# Patient Record
Sex: MALE | Race: OTHER | HISPANIC OR LATINO | URBAN - METROPOLITAN AREA
[De-identification: names, ages, dates, MRNs, and addresses within clinical notes are randomized per-mention and may not be internally consistent; named-entity substitution may affect disease eponyms.]

---

## 2019-11-07 ENCOUNTER — EMERGENCY (EMERGENCY)
Facility: HOSPITAL | Age: 44
LOS: 1 days | Discharge: HOME | End: 2019-11-07
Attending: EMERGENCY MEDICINE | Admitting: EMERGENCY MEDICINE
Payer: COMMERCIAL

## 2019-11-07 VITALS
HEART RATE: 66 BPM | SYSTOLIC BLOOD PRESSURE: 137 MMHG | WEIGHT: 179.9 LBS | OXYGEN SATURATION: 99 % | TEMPERATURE: 99 F | HEIGHT: 62 IN | DIASTOLIC BLOOD PRESSURE: 89 MMHG | RESPIRATION RATE: 16 BRPM

## 2019-11-07 VITALS
OXYGEN SATURATION: 98 % | HEART RATE: 76 BPM | TEMPERATURE: 98 F | RESPIRATION RATE: 18 BRPM | SYSTOLIC BLOOD PRESSURE: 140 MMHG | DIASTOLIC BLOOD PRESSURE: 92 MMHG

## 2019-11-07 PROCEDURE — 99283 EMERGENCY DEPT VISIT LOW MDM: CPT

## 2019-11-07 PROCEDURE — 99283 EMERGENCY DEPT VISIT LOW MDM: CPT | Mod: 25

## 2019-11-07 PROCEDURE — 73630 X-RAY EXAM OF FOOT: CPT

## 2019-11-07 PROCEDURE — 73630 X-RAY EXAM OF FOOT: CPT | Mod: 26,LT

## 2019-11-07 RX ORDER — OXYCODONE HYDROCHLORIDE 5 MG/1
5 TABLET ORAL ONCE
Refills: 0 | Status: DISCONTINUED | OUTPATIENT
Start: 2019-11-07 | End: 2019-11-07

## 2019-11-07 RX ORDER — OXYCODONE HYDROCHLORIDE 5 MG/1
1 TABLET ORAL
Qty: 8 | Refills: 0
Start: 2019-11-07 | End: 2019-11-08

## 2019-11-07 RX ORDER — IBUPROFEN 200 MG
600 TABLET ORAL ONCE
Refills: 0 | Status: COMPLETED | OUTPATIENT
Start: 2019-11-07 | End: 2019-11-07

## 2019-11-07 RX ADMIN — Medication 600 MILLIGRAM(S): at 17:45

## 2019-11-07 RX ADMIN — OXYCODONE HYDROCHLORIDE 5 MILLIGRAM(S): 5 TABLET ORAL at 18:22

## 2019-11-07 RX ADMIN — Medication 600 MILLIGRAM(S): at 17:14

## 2019-11-07 RX ADMIN — OXYCODONE HYDROCHLORIDE 5 MILLIGRAM(S): 5 TABLET ORAL at 18:08

## 2019-11-07 NOTE — ED PROVIDER NOTE - OBJECTIVE STATEMENT
otherwise healthy 43 year old male presents with pain and swelling to left foot after injury. fork lift fell onto left foot at work PTA. has not tried to walk on foot. pain moderate in nature. no n/t. denies other injuries.   no PCP

## 2019-11-07 NOTE — ED PROVIDER NOTE - PROGRESS NOTE DETAILS
Reevaluated patient at bedside.  Patient feeling improved.  Discussed the results of all diagnostic testing in ED. RICES discussed. An opportunity to ask questions was given.  Discussed the importance of prompt, close medical follow-up.  Patient will return with any changes, concerns or persistent / worsening symptoms.  Understanding of all instructions verbalized.

## 2019-11-07 NOTE — ED PROVIDER NOTE - CLINICAL SUMMARY MEDICAL DECISION MAKING FREE TEXT BOX
Farley:  L foot contusion with unremarkable xrays pain and swelling to left foot after injury. will x-ray to eval for fx. motrin for pain    Farley:  L foot contusion with unremarkable xrays

## 2019-11-07 NOTE — ED ADULT NURSE REASSESSMENT NOTE - NS ED NURSE REASSESS COMMENT FT1
Patient was discharged in stable condition, instructions were provided and reviewed with MD Farley, verbalized understanding. All belongings were taken by patient, left AAOX4 was ambulatory and was accompanied by spouse.

## 2019-11-07 NOTE — ED PROVIDER NOTE - MUSCULOSKELETAL, MLM
tenderness and swelling to mid dorsal aspect of left foot. no tenderness to base of 5th metatarsal or lateral/medial malleolus

## 2019-11-07 NOTE — ED PROVIDER NOTE - PATIENT PORTAL LINK FT
You can access the FollowMyHealth Patient Portal offered by [facility] by registering at the following website: [email or web address]. By joining [employer]’s FollowMyHealth portal, you will also be able to view your health information using other applications (apps) compatible with our system.

## 2019-11-07 NOTE — ED ADULT NURSE NOTE - OBJECTIVE STATEMENT
Patient presents to ED, AAOX4, complaining of left foot injury at work, as per patient: "a machine hit my foot and now it hurts to even more my toes". Noted to have swelling to left foot, pulses and sensation intact.

## 2020-03-11 ENCOUNTER — EMERGENCY (EMERGENCY)
Facility: HOSPITAL | Age: 45
LOS: 1 days | Discharge: HOME | End: 2020-03-11
Attending: EMERGENCY MEDICINE | Admitting: EMERGENCY MEDICINE
Payer: COMMERCIAL

## 2020-03-11 VITALS — HEART RATE: 68 BPM | SYSTOLIC BLOOD PRESSURE: 138 MMHG | DIASTOLIC BLOOD PRESSURE: 78 MMHG | RESPIRATION RATE: 16 BRPM

## 2020-03-11 VITALS
WEIGHT: 179.9 LBS | HEART RATE: 78 BPM | HEIGHT: 62 IN | OXYGEN SATURATION: 99 % | SYSTOLIC BLOOD PRESSURE: 140 MMHG | RESPIRATION RATE: 18 BRPM | DIASTOLIC BLOOD PRESSURE: 90 MMHG | TEMPERATURE: 98 F

## 2020-03-11 PROCEDURE — 73562 X-RAY EXAM OF KNEE 3: CPT

## 2020-03-11 PROCEDURE — 99283 EMERGENCY DEPT VISIT LOW MDM: CPT

## 2020-03-11 PROCEDURE — 99283 EMERGENCY DEPT VISIT LOW MDM: CPT | Mod: 25

## 2020-03-11 PROCEDURE — 73562 X-RAY EXAM OF KNEE 3: CPT | Mod: 26,RT

## 2020-03-11 NOTE — ED PROVIDER NOTE - PATIENT PORTAL LINK FT
You can access the FollowMyHealth Patient Portal offered by [facility] by registering at the following website: [email or web address]. By joining [employer]’s FollowMyHealth portal, you will also be able to view your health information using other applications (apps) compatible with our system.

## 2020-03-11 NOTE — ED PROVIDER NOTE - OBJECTIVE STATEMENT
45 y/o male presents to the ED c/o right knee pain. 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 Pt took advil PTA. Came to ED for continued pain and difficulty walking on right knee.

## 2020-03-11 NOTE — ED PROVIDER NOTE - MUSCULOSKELETAL, MLM
RLE chronic appearing deformity of lower leg and foot, FROM, no acute appearing deformity, no edema, nl tone, no point tenderness

## 2020-03-11 NOTE — ED PROVIDER NOTE - CARE PROVIDER_API CALL
Julio Jiang (DO)  Orthopaedic Surgery  [address] [place] [address]  Phone: [phone]  Fax: [phone]  Follow Up Time: 1-3 Days

## 2020-03-11 NOTE — ED ADULT NURSE NOTE - PRIMARY CARE PROVIDER
Spoke with pharmacy. I have been informed. Ms. Thayer. Does not need a PA. Due to her insurance is covering the medication just at a high cost of $1000. Attempted to contact. [facility]. Care pina. Twice. Line is busy. in regards to them covering.Trulicity or Victoza.   
n/a

## 2020-03-11 NOTE — ED PROVIDER NOTE - CLINICAL SUMMARY MEDICAL DECISION MAKING FREE TEXT BOX
44 y.o. M c/o right knee injury s/p fall at work, h/o birth defect of right lower leg, no obvious abnormality on exam, will xr to r/o fx, will need knee brace/crutches and outpt ortho if not improving

## 2021-12-21 ENCOUNTER — EMERGENCY (EMERGENCY)
Facility: HOSPITAL | Age: 46
LOS: 1 days | Discharge: HOME | End: 2021-12-21
Attending: INTERNAL MEDICINE | Admitting: INTERNAL MEDICINE
Payer: COMMERCIAL

## 2021-12-21 VITALS
HEART RATE: 86 BPM | OXYGEN SATURATION: 96 % | TEMPERATURE: 98 F | DIASTOLIC BLOOD PRESSURE: 85 MMHG | RESPIRATION RATE: 16 BRPM | HEIGHT: 62 IN | WEIGHT: 191.8 LBS | SYSTOLIC BLOOD PRESSURE: 130 MMHG

## 2021-12-21 VITALS
RESPIRATION RATE: 20 BRPM | HEART RATE: 82 BPM | DIASTOLIC BLOOD PRESSURE: 87 MMHG | OXYGEN SATURATION: 100 % | SYSTOLIC BLOOD PRESSURE: 128 MMHG | TEMPERATURE: 99 F

## 2021-12-21 PROCEDURE — 72100 X-RAY EXAM L-S SPINE 2/3 VWS: CPT | Mod: 26

## 2021-12-21 PROCEDURE — 99284 EMERGENCY DEPT VISIT MOD MDM: CPT

## 2021-12-21 PROCEDURE — 72100 X-RAY EXAM L-S SPINE 2/3 VWS: CPT

## 2021-12-21 PROCEDURE — 99283 EMERGENCY DEPT VISIT LOW MDM: CPT | Mod: 25

## 2021-12-21 RX ORDER — OXYCODONE AND ACETAMINOPHEN 5; 325 MG/1; MG/1
1 TABLET ORAL ONCE
Refills: 0 | Status: DISCONTINUED | OUTPATIENT
Start: 2021-12-21 | End: 2021-12-21

## 2021-12-21 RX ORDER — OXYCODONE AND ACETAMINOPHEN 5; 325 MG/1; MG/1
1 TABLET ORAL
Qty: 12 | Refills: 0
Start: 2021-12-21 | End: 2021-12-23

## 2021-12-21 RX ADMIN — Medication 50 MILLIGRAM(S): at 01:27

## 2021-12-21 RX ADMIN — OXYCODONE AND ACETAMINOPHEN 1 TABLET(S): 5; 325 TABLET ORAL at 01:27

## 2021-12-21 NOTE — ED PROVIDER NOTE - PATIENT PORTAL LINK FT
You can access the FollowMyHealth Patient Portal offered by [facility] by registering at the following website: [email or web address]. By joining [employer]’s FollowMyHealth portal, you will also be able to view your health information using other applications (apps) compatible with our system.

## 2021-12-21 NOTE — ED PROVIDER NOTE - NSFOLLOWUPINSTRUCTIONS_ED_ALL_ED_FT
1) Follow-up with your Primary Medical Doctor or referred doctor. Call today / next business day for prompt follow-up.  2) With back pain, whether it is a new pain or a chronic pain, it is very important to have close, prompt outpatient follow-up for continued workup, evaluation and definitive diagnosis.  If you develop worsening or persistent pain, any numbness, tingling, weakness of one or both of your legs, any fever, or any changes / difficulty urinating then you will need to see your back doctor immediately or you can return to the emergency room.  Many times your doctor will need to set you up for further imaging / testing such as an MRI.  3) See attached instruction sheets for additional information, including information regarding signs and symptoms to look out for, reasons to seek immediate care and other important instructions.  4) Follow-up with your Orthopedist or Neurosurgeon as soon as possible. If you do not have one then you will need to call the referred doctor as soon as possible (today / next business day) for prompt follow-up for further workup and treatment. See the referred doctor for information.

## 2021-12-21 NOTE — ED ADULT NURSE NOTE - NSIMPLEMENTINTERV_GEN_ALL_ED
Implemented All Universal Safety Interventions:  [employer] to call system. Call bell, personal items and telephone within reach. Instruct patient to call for assistance. Room bathroom lighting operational. Non-slip footwear when patient is off stretcher. Physically safe environment: no spills, clutter or unnecessary equipment. Stretcher in lowest position, wheels locked, appropriate side rails in place.

## 2021-12-21 NOTE — ED PROVIDER NOTE - OBJECTIVE STATEMENT
"I was lifting big rocks at work and now my back hurts"  back pain/injury 45 y/o male with lumbar back pain "I was lifting big rocks at work and now my back hurts", right side with right leg radiation, no weakness, no numbness no bladder no bowel changes

## 2021-12-21 NOTE — ED ADULT NURSE NOTE - NS ED NURSE RECORD ANOTHER HT AND WT
SKTC  5\" x 5  Exercise 17: DKTC  5\" x 5  Exercise 18: Contract/relax HS stretch  x 4 ea  Exercise 19: E-stim + HP to low back  (IFC) x 20' (only set to about 20 it kicks off due to overheating on channel 2)     Modalities  Moist heat: during e-stim  E-stim (parameters): (IFC) to low back x 20 min (only set to about 20)      Assessment:   Conditions Requiring Skilled Therapeutic Intervention  Body structures, Functions, Activity limitations: Decreased ROM; Decreased strength;Decreased endurance;Decreased balance;Decreased high-level IADLs  Assessment: Patient has difficultly with tandem walking requiring HHA. He had two LOB postiorly with pertebation.  e-stim overheating with higher setting than 20. He reports no pain post session. Treatment Diagnosis: Ataxia, low back pain  REQUIRES PT FOLLOW UP: Yes  Discharge Recommendations: Continue to assess pending progress      Goals:  Short term goals  Time Frame for Short term goals: 3-4 weeks  Short term goal 1: Independent with HEP. Short term goal 2: Maintain good TA contraction x 10 sec  Short term goal 3: Amb at least 800' in 6MWT  Short term goal 4: Sit to stand x 10 in 30\" w/o use of UEs  Long term goals  Time Frame for Long term goals : 4-6 weeks  Long term goal 1: Reduce muscle tension throughout lumbar spine  Long term goal 2: Improve HS length to at least 75 degrees bilaterally  Long term goal 3: Improve Miller Balance score to at least 53/56. Long term goal 4: Improve Oswestry score to less than 40% impairment.   Patient Goals   Patient goals : improve balance and reduce low back pain    Plan:    Plan  Times per week: 2x  Plan weeks: 4-6 weeks  Current Treatment Recommendations: Strengthening, ROM, Balance Training, Patient/Caregiver Education & Training, Safety Education & Training, Home Exercise Program, Modalities, Equipment Evaluation, Education, & procurement, Endurance Training  Timed Code Treatment Minutes: 50 Minutes     Therapy Time   Individual Concurrent Group Co-treatment   Time In 6824         Time Out 1415         Minutes 70         Timed Code Treatment Minutes: 7[address] Anand Knutson Mc, AWA    Electronically signed by Candy Lombard, PTA on 10/3/2017 at 2:27 PM Yes

## 2021-12-21 NOTE — ED PROVIDER NOTE - CARE PROVIDER_API CALL
Melquiades Wagoner  ORTHOPAEDIC SURGERY  [address] [place] [address]  Phone: [phone]  Fax: [phone]  Follow Up Time: 1-3 Days

## 2022-08-25 ENCOUNTER — EMERGENCY (EMERGENCY)
Facility: HOSPITAL | Age: 47
LOS: 1 days | Discharge: HOME | End: 2022-08-25
Attending: EMERGENCY MEDICINE | Admitting: EMERGENCY MEDICINE
Payer: COMMERCIAL

## 2022-08-25 PROCEDURE — 99283 EMERGENCY DEPT VISIT LOW MDM: CPT

## 2022-08-26 VITALS
HEART RATE: 77 BPM | OXYGEN SATURATION: 97 % | RESPIRATION RATE: 18 BRPM | DIASTOLIC BLOOD PRESSURE: 92 MMHG | TEMPERATURE: 98 F | SYSTOLIC BLOOD PRESSURE: 143 MMHG

## 2022-08-26 VITALS
WEIGHT: 186.29 LBS | DIASTOLIC BLOOD PRESSURE: 64 MMHG | SYSTOLIC BLOOD PRESSURE: 126 MMHG | OXYGEN SATURATION: 95 % | TEMPERATURE: 98 F | RESPIRATION RATE: 18 BRPM | HEART RATE: 89 BPM | HEIGHT: 62 IN

## 2022-08-26 PROCEDURE — 99283 EMERGENCY DEPT VISIT LOW MDM: CPT | Mod: 25

## 2022-08-26 PROCEDURE — 73630 X-RAY EXAM OF FOOT: CPT

## 2022-08-26 PROCEDURE — 73630 X-RAY EXAM OF FOOT: CPT | Mod: 26,LT

## 2022-08-26 RX ORDER — ACETAMINOPHEN 500 MG
650 TABLET ORAL ONCE
Refills: 0 | Status: COMPLETED | OUTPATIENT
Start: 2022-08-26 | End: 2022-08-26

## 2022-08-26 RX ADMIN — Medication 650 MILLIGRAM(S): at 01:29

## 2022-08-26 RX ADMIN — Medication 650 MILLIGRAM(S): at 02:05

## 2022-08-26 NOTE — ED PROVIDER NOTE - OBJECTIVE STATEMENT
46 y.o. M c/o pain left foot, plantar aspect around 4th/5th MTPs, denies trauma, no wound, isn't sure what started it, never had this before, didn't take anything for pain at home, mostly hurts to walk

## 2022-08-26 NOTE — ED PROVIDER NOTE - MUSCULOSKELETAL, MLM
left foot, no deformity, FROM, mild ttp plantar aspect 4th and 5th mtp area, skin intact, normal sensation, normal cap refill

## 2022-08-26 NOTE — ED PROVIDER NOTE - PATIENT PORTAL LINK FT
You can access the FollowMyHealth Patient Portal offered by [facility] by registering at the following website: [email or web address]. By joining [employer]’s FollowMyHealth portal, you will also be able to view your health information using other applications (apps) compatible with our system.

## 2022-08-26 NOTE — ED PROVIDER NOTE - NSFOLLOWUPCLINICS_GEN_ALL_ED_FT
[facility]  Podiatry  [address] [place] [address] - [address] [place] [address]  Phone: [phone]  Fax:   Follow Up Time: 1-3 Days

## 2023-02-25 ENCOUNTER — EMERGENCY (EMERGENCY)
Facility: HOSPITAL | Age: 48
LOS: 1 days | Discharge: HOME | End: 2023-02-25
Attending: EMERGENCY MEDICINE | Admitting: EMERGENCY MEDICINE
Payer: COMMERCIAL

## 2023-02-25 VITALS
HEIGHT: 62 IN | WEIGHT: 185.19 LBS | SYSTOLIC BLOOD PRESSURE: 127 MMHG | OXYGEN SATURATION: 97 % | DIASTOLIC BLOOD PRESSURE: 88 MMHG | RESPIRATION RATE: 16 BRPM | HEART RATE: 69 BPM | TEMPERATURE: 98 F

## 2023-02-25 VITALS
TEMPERATURE: 98 F | HEART RATE: 75 BPM | OXYGEN SATURATION: 97 % | DIASTOLIC BLOOD PRESSURE: 78 MMHG | SYSTOLIC BLOOD PRESSURE: 130 MMHG | RESPIRATION RATE: 17 BRPM

## 2023-02-25 PROCEDURE — 71250 CT THORAX DX C-: CPT | Mod: MA

## 2023-02-25 PROCEDURE — 99282 EMERGENCY DEPT VISIT SF MDM: CPT

## 2023-02-25 PROCEDURE — 72128 CT CHEST SPINE W/O DYE: CPT | Mod: MA

## 2023-02-25 PROCEDURE — 71250 CT THORAX DX C-: CPT | Mod: 26,MA

## 2023-02-25 PROCEDURE — 72131 CT LUMBAR SPINE W/O DYE: CPT | Mod: MA

## 2023-02-25 PROCEDURE — 99285 EMERGENCY DEPT VISIT HI MDM: CPT

## 2023-02-25 PROCEDURE — 72131 CT LUMBAR SPINE W/O DYE: CPT | Mod: 26,MA

## 2023-02-25 PROCEDURE — 99284 EMERGENCY DEPT VISIT MOD MDM: CPT | Mod: 25

## 2023-02-25 PROCEDURE — 72128 CT CHEST SPINE W/O DYE: CPT | Mod: 26,MA

## 2023-02-25 RX ORDER — CYCLOBENZAPRINE HYDROCHLORIDE 10 MG/1
10 TABLET, FILM COATED ORAL ONCE
Refills: 0 | Status: COMPLETED | OUTPATIENT
Start: 2023-02-25 | End: 2023-02-25

## 2023-02-25 RX ORDER — LIDOCAINE 4 G/100G
1 CREAM TOPICAL
Qty: 5 | Refills: 0
Start: 2023-02-25 | End: 2023-03-01

## 2023-02-25 RX ORDER — LIDOCAINE 4 G/100G
1 CREAM TOPICAL ONCE
Refills: 0 | Status: COMPLETED | OUTPATIENT
Start: 2023-02-25 | End: 2023-02-25

## 2023-02-25 RX ORDER — OXYCODONE AND ACETAMINOPHEN 5; 325 MG/1; MG/1
1 TABLET ORAL
Qty: 16 | Refills: 0
Start: 2023-02-25 | End: 2023-02-28

## 2023-02-25 RX ORDER — CYCLOBENZAPRINE HYDROCHLORIDE 10 MG/1
1 TABLET, FILM COATED ORAL
Qty: 15 | Refills: 0
Start: 2023-02-25 | End: 2023-03-01

## 2023-02-25 RX ADMIN — LIDOCAINE 1 PATCH: 4 CREAM TOPICAL at 08:58

## 2023-02-25 RX ADMIN — CYCLOBENZAPRINE HYDROCHLORIDE 10 MILLIGRAM(S): 10 TABLET, FILM COATED ORAL at 08:58

## 2023-02-25 NOTE — ED PROVIDER NOTE - OBJECTIVE STATEMENT
Patient is a 47-year-old male who presents to the emergency room with a chief complaint of back pain.  No significant past medical history.  Patient reports that approximately 2 to 3 days ago he suffered a fall while at work.  He reports that he was getting off of the machine went to bed ground-level and he slid on what he believes was an oil spine losing his balance and falling striking his right side of his back on the ground.  Denies LOC and did not hit his head.  He was able to get up and has been ambulatory since.  Did not seek medical attention initially.  He reports continued pain to the right mid to lower back worse with movement.  He has been taking Advil for the pain with little to no improvement in symptoms.  Last took Advil yesterday.  Denies any prior history of back injury.  Denies any radiation of pain.  Denies loss of bowel or bladder control and denies numbness or tingling in the extremities.  Denies any chest pain shortness of breath or abdominal pain denies any neck pain. Denies HA's

## 2023-02-25 NOTE — ED PROVIDER NOTE - CARE PROVIDER_API CALL
Jose Nettles (MD)  Orthopedics  [address] [facility]  [place] [address]  Phone: [phone]  Fax: [phone]  Follow Up Time:     Zen Medley)  Orthopaedic Surgery  [address] [facility]  [place] [address]  Phone: [phone]  Fax: [phone]  Follow Up Time:

## 2023-02-25 NOTE — ED PROVIDER NOTE - DIFFERENTIAL DIAGNOSIS
Differential Diagnosis Differential includes but is not limited to possible bony fracture versus muscle strain versus contusion.  Will obtain CT of the chest to exclude possible rib fracture and will obtain CT imaging of the thoracic and lumbar spine.

## 2023-02-25 NOTE — ED ADULT NURSE REASSESSMENT NOTE - GENERAL PATIENT STATE
Addended by: ALDEN BYRD on: 5/11/2017 12:39 PM     Modules accepted: Orders    
comfortable appearance

## 2023-02-25 NOTE — ED PROVIDER NOTE - CLINICAL SUMMARY MEDICAL DECISION MAKING FREE TEXT BOX
Patient is a 47-year-old male who presents to the emergency room with a chief complaint of back pain.  No significant past medical history.  Patient reports that approximately 2 to 3 days ago he suffered a fall while at work.  He reports that he was getting off of the machine went to bed ground-level and he slid on what he believes was an oil spine losing his balance and falling striking his right side of his back on the ground.  Denies LOC and did not hit his head.  He was able to get up and has been ambulatory since.  Did not seek medical attention initially.  He reports continued pain to the right mid to lower back worse with movement.  He has been taking Advil for the pain with little to no improvement in symptoms.  Last took Advil yesterday.  Denies any prior history of back injury.  Denies any radiation of pain.  Denies loss of bowel or bladder control and denies numbness or tingling in the extremities.  Denies any chest pain shortness of breath or abdominal pain denies any neck pain. Denies HA's Differential includes but is not limited to possible bony fracture versus muscle strain versus contusion.  Will obtain CT of the chest to exclude possible rib fracture and will obtain CT imaging of the thoracic and lumbar spine. Will medicate as needed for pain and monitor. Patient is a 47-year-old male who presents to the emergency room with a chief complaint of back pain.  No significant past medical history.  Patient reports that approximately 2 to 3 days ago he suffered a fall while at work.  He reports that he was getting off of the machine went to bed ground-level and he slid on what he believes was an oil spine losing his balance and falling striking his right side of his back on the ground.  Denies LOC and did not hit his head.  He was able to get up and has been ambulatory since.  Did not seek medical attention initially.  He reports continued pain to the right mid to lower back worse with movement.  He has been taking Advil for the pain with little to no improvement in symptoms.  Last took Advil yesterday.  Denies any prior history of back injury.  Denies any radiation of pain.  Denies loss of bowel or bladder control and denies numbness or tingling in the extremities.  Denies any chest pain shortness of breath or abdominal pain denies any neck pain. Denies HA's Differential includes but is not limited to possible bony fracture versus muscle strain versus contusion.  Will obtain CT of the chest to exclude possible rib fracture and will obtain CT imaging of the thoracic and lumbar spine. Will medicate as needed for pain and monitor. Results of CT imaging are reviewed there is a 4 mm right lower lobe nodule with patient was made aware of by PMD no pleural effusion no lymphadenopathy heart is normal size there is a little bit of fatty liver noted.  Patient has a acute posterior right 12th rib fracture and acute right L1 and L2 transverse process fractures there is also a chronic right 11th rib fracture.  No pneumothorax.  Resting comfortably at this time.  Consulted with orthopedics Dr. Nettles patient can be discharged home with pain control muscle relaxants and outpatient follow-up.  Results provided to patient all questions answered stable for discharge home.

## 2023-02-25 NOTE — ED ADULT NURSE NOTE - OBJECTIVE STATEMENT
pt ambulatory in ED, comes today c/o non radiating, Right lower back pain x 3 days. 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 pt denies numb/ting, focal weakness cp or sob. did not take any pain medicine today

## 2023-02-25 NOTE — CONSULT NOTE ADULT - ASSESSMENT
47y healthy M pw L1/L2 transverse process frx after a fall - ambulating with normal NM exam and absence of radicular sx.  Would like to return to work asap but counseled on pain control given TP/rib frx, can f/u with me in next 2 weeks.  -po pain control  -f/u in office, likely PT - would like chronic R knee pain evaluated as well

## 2023-02-25 NOTE — ED PROVIDER NOTE - PATIENT PORTAL LINK FT
You can access the FollowMyHealth Patient Portal offered by [facility] by registering at the following website: [email or web address]. By joining [employer]’s FollowMyHealth portal, you will also be able to view your health information using other applications (apps) compatible with our system.

## 2023-02-25 NOTE — ED PROVIDER NOTE - NSFOLLOWUPINSTRUCTIONS_ED_ALL_ED_FT
Return to the ED for any new or worsening symptoms  Take your medication as prescribed  Percocet per label instructions as needed for severe pain do not drive when taking this. Remember this medication contains Tylenol. For mild to moderate pain Motrin.   Lidoderm patch to affected region per label instructions   Flexeril per label instructions as needed for pain  Incentive spirometry per label instructions 10 times an hour   Follow up with your PMD for further work up of your pulmonary nodule  Follow up with orthopedics for your knee pain and fracture   No work until cleared by orthopedics   Advance activity as tolerated      Rib Fracture       A rib fracture is a break or crack in one of the bones of the ribs. The ribs are like a cage that goes around your upper chest. A broken or cracked rib is often painful, but most do not cause other problems. Most rib fractures usually heal on their own in 1–3 months.      What are the causes?    •Doing movements over and over again with a lot of force, such as pitching a baseball or having a very bad cough.      •A direct hit to the chest.      •Cancer that has spread to the bones.        What are the signs or symptoms?    •Pain when you breathe in or cough.      •Pain when someone presses on the injured area.      •Feeling short of breath.        How is this treated?    Treatment depends on how bad the fracture is. In general:  •Most rib fractures usually heal on their own in 1–3 months.      •Healing may take longer if you have a cough or are doing activities that make the injury worse.      •While you heal, you may be given medicines to control pain.      •You will also be taught deep breathing exercises.      •Very bad injuries may require a stay at the hospital or surgery.        Follow these instructions at home:    Managing pain, stiffness, and swelling   •If told, put ice on the injured area. To do this:  •Put ice in a plastic bag.      •Place a towel between your skin and the bag.      •Leave the ice on for 20 minutes, 2–3 times a day.      •Take off the ice if your skin turns bright red. This is very important. If you cannot feel pain, heat, or cold, you have a greater risk of damage to the area.        •Take over-the-counter and prescription medicines only as told by your doctor.      Activity     •Avoid activities that cause pain to the injured area. Protect your injured area.      •Slowly increase activity as told by your doctor.      General instructions   •Do deep breathing exercises as told by your doctor. You may be told to:  •Take deep breaths many times a day.      •Cough several times a day while hugging a pillow.      •Use a device (incentive spirometer) to do deep breathing many times a day.        •Drink enough fluid to keep your pee (urine) clear or pale yellow.      • Do not wear a rib belt or binder.      •Keep all follow-up visits.        Contact a doctor if:    •You have a fever.        Get help right away if:    •You have trouble breathing.      •You are short of breath.      •You cannot stop coughing.      •You cough up thick or bloody spit.      •You feel like you may vomit (nauseous), vomit, or have belly (abdominal) pain.      •Your pain gets worse and medicine does not help.      These symptoms may be an emergency. Get help right away. Call your local emergency services (911 in the [place]   • Do not wait to see if the symptoms will go away.       • Do not drive yourself to the hospital.         Summary    •A rib fracture is a break or crack in one of the bones of the ribs.      •Apply ice to the injured area and take medicines for pain as told by your doctor.      •Take deep breaths and cough several times a day. Hug a pillow every time you cough.      This information is not intended to replace advice given to you by your health care provider. Make sure you discuss any questions you have with your health care provider.        Transverse Process Fracture    Back view of a person showing the lumbar spine and pelvis with a close-up of transverse process fractures.   Bones of the spine (vertebrae) have portions that extend off to either side of the spine. These portions of bone are called transverse processes. A transverse process fracture, which is also called a rotation spine fracture, is a break in a transverse process.      What are the causes?    This condition may be caused by:  •A fall from a great height.      •A car accident.      •A sports injury.      •A gunshot wound.      •A hard, direct hit to the back.      This kind of fracture often results from a sudden and severe bending of the spine to one side. Depending on the cause of the fracture, one or more bones may be affected.      What increases the risk?    You are more likely to develop this condition if:  •You have thinning and loss of density in the bones (osteoporosis).      •You play a contact sport.        What are the signs or symptoms?    The main symptom of this condition is back pain. The pain may:  •Be felt on the side of the spine (flank) where the fracture is.      •Get worse when you move or take a deep breath.        How is this diagnosed?    This condition may be diagnosed based on:  •Your symptoms.      •Your medical history.      •A physical exam.      You may also have other tests, including:  •X-rays.      •A CT scan.      •MRI.        How is this treated?    Most transverse process fractures heal on their own with time and rest. Treatment may involve supportive care, such as:  •Limiting activity.    •Medicines, such as:  •Pain medicine.      •Muscle-relaxing medicine.        •Physical therapy.      •A neck or back brace.        Follow these instructions at home:    If you have a brace:     •Wear the neck or back brace as told by your health care provider. Remove it only as told by your health care provider.      •Keep the brace clean.     •If the brace is not waterproof:   •Do not let it get wet.      •Cover it with a watertight covering when you take a bath or a shower.          Managing pain, stiffness, and swelling   Bag of ice on a towel on the skin. •If directed, put ice on the injured area:  •If you have a removable brace, remove it as told by your health care provider.      •Put ice in a plastic bag.      •Place a towel between your skin and the bag.      •Leave the ice on for 20 minutes, 2–3 times a day.        Medicines     •Take over-the-counter and prescription medicines only as told by your health care provider.      • Do not drive or use heavy machinery while taking prescription pain medicine.    •If you are taking prescription pain medicine, take actions to prevent or treat constipation. Your health care provider may recommend that you:  •Drink enough fluid to keep your urine pale yellow.      •Eat foods that are high in fiber, such as fresh fruits and vegetables, whole grains, and beans.      •Limit foods that are high in fat and processed sugars, such as fried or sweet foods.      •Take an over-the-counter or prescription medicine for constipation.        Activity   •Stay in bed (on bed rest) only as directed by your health care provider.  •Avoid being in bed for a long time without moving. Get up to take short walks every 1–2 hours. This is important to improve blood flow and breathing. Ask for help if you feel weak or unsteady.        •Return to your normal activities when your health care provider says it is okay. Ask if there are any activities that you should not do.      •Do physical therapy exercises as recommended by your health care provider.      General instructions     • Do not use any products that contain nicotine or tobacco, such as cigarettes and e-cigarettes. These can delay bone healing. If you need help quitting, ask your health care provider.      •Keep all follow-up visits as told by your health care provider. This is important. Visits can help to prevent permanent injury, disability, and long-lasting (chronic) pain.        Contact a health care provider if:    •You have a fever.      •You develop a cough that makes your pain worse.      •Your pain medicine is not helping.      •Your pain does not get better over time.      •You cannot return to your normal activities as planned or expected.        Get help right away if:    •Your pain is very bad and it suddenly gets worse.      •You are unable to move any body part (paralysis) that is below the level of your injury.      •You have numbness, tingling, or weakness in any body part that is below the level of your injury.      •You cannot control your bladder or bowels.        Summary    •A transverse process fracture is a break in the portion of the bone that extends to the side of the spine.      •Most transverse process fractures heal on their own with time and rest.      •You may also have supportive treatments such as a back brace, pain medicines, and physical therapy.      •Keep all follow-up visits. This is important and will help to prevent permanent injury, disability, and long-lasting (chronic) pain.      This information is not intended to replace advice given to you by your health care provider. Make sure you discuss any questions you have with your health care provider.

## 2023-02-25 NOTE — CONSULT NOTE ADULT - SUBJECTIVE AND OBJECTIVE BOX
47 healthy M pw lbp after a fall 3 days ago.  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  He has still been ambulating and working but notes the persistent pain concern him that he may have a more serious injury.  No numbness/paresthesias.  No radicular sx, no BBI.    O:  Gen: NAD  Resp: nonlabored    Spine:  Tender paraspinal lumbar, skin in tact  UE:  5/5 D B T WE SF IO  SILT C4-T1  (-) Vincent  2+ rad bcr wwp    LE:  5/5 IP Q HS EHL TA GS  SILT L3-S1  (-) clonus, babinski  2+ rad bcr wwp    CT:  No pneumothorax.  Acute posterior right 12th rib fracture.  Acute right L1 and L2 transverse process fractures.

## 2023-02-25 NOTE — ED ADULT TRIAGE NOTE - CHIEF COMPLAINT QUOTE
" I fell 3 days ago at my work - now I have lower back pain " Pt have not taken any pain medication since the fall

## 2023-02-25 NOTE — ED ADULT NURSE REASSESSMENT NOTE - NS ED NURSE REASSESS COMMENT FT1
pt back from CT scan, resting supine in bed. medicated for pain, awaiting radiology results. pt safety maintained

## 2023-02-25 NOTE — ED PROVIDER NOTE - CARE PROVIDERS DIRECT ADDRESSES
Prescription approved per [facility] Refill Protocol.  
,DirectAddress_Unknown,[email or web address]

## 2023-02-25 NOTE — ED PROVIDER NOTE - PHYSICAL EXAMINATION
no midline cervical TTP  mid to lower TTP to thoracic midline and diffuse midline lumbar TTP  inferior posterior right rib TTP  no ecchymosis noted to the back   FROM of ext, pt ambulatory   NVI x 4

## 2023-05-11 NOTE — ED PROVIDER NOTE - NS ED MD EM SELECTION
Problem: Fatigue  Goal: Improved Activity Tolerance  Intervention: Promote Improved Energy  Flowsheets (Taken 5/11/2023 1506)  Fatigue Management: fatigue-related activity identified  Activity Management: Ambulated -L4      36088 Exp Problem Focused - Mod. Complex

## 2023-07-10 ENCOUNTER — EMERGENCY (EMERGENCY)
Facility: HOSPITAL | Age: 48
LOS: 1 days | Discharge: HOME | End: 2023-07-10
Attending: EMERGENCY MEDICINE | Admitting: EMERGENCY MEDICINE
Payer: COMMERCIAL

## 2023-07-10 VITALS
OXYGEN SATURATION: 96 % | SYSTOLIC BLOOD PRESSURE: 115 MMHG | RESPIRATION RATE: 16 BRPM | DIASTOLIC BLOOD PRESSURE: 75 MMHG | HEART RATE: 66 BPM | TEMPERATURE: 98 F

## 2023-07-10 VITALS
TEMPERATURE: 98 F | OXYGEN SATURATION: 99 % | HEIGHT: 62 IN | SYSTOLIC BLOOD PRESSURE: 116 MMHG | RESPIRATION RATE: 14 BRPM | DIASTOLIC BLOOD PRESSURE: 69 MMHG | WEIGHT: 179.9 LBS | HEART RATE: 94 BPM

## 2023-07-10 LAB
ALBUMIN SERPL ELPH-MCNC: 3.8 G/DL — SIGNIFICANT CHANGE UP (ref 3.3–5)
ALP SERPL-CCNC: 65 U/L — SIGNIFICANT CHANGE UP (ref 30–120)
ALT FLD-CCNC: 83 U/L DA — HIGH (ref 10–60)
ANION GAP SERPL CALC-SCNC: 14 MMOL/L — SIGNIFICANT CHANGE UP (ref 5–17)
AST SERPL-CCNC: 45 U/L — HIGH (ref 10–40)
BASOPHILS # BLD AUTO: 0.07 K/UL — SIGNIFICANT CHANGE UP (ref 0–0.2)
BASOPHILS NFR BLD AUTO: 0.9 % — SIGNIFICANT CHANGE UP (ref 0–2)
BILIRUB SERPL-MCNC: 0.3 MG/DL — SIGNIFICANT CHANGE UP (ref 0.2–1.2)
BUN SERPL-MCNC: 10 MG/DL — SIGNIFICANT CHANGE UP (ref 7–23)
CALCIUM SERPL-MCNC: 8.5 MG/DL — SIGNIFICANT CHANGE UP (ref 8.4–10.5)
CHLORIDE SERPL-SCNC: 100 MMOL/L — SIGNIFICANT CHANGE UP (ref 96–108)
CO2 SERPL-SCNC: 22 MMOL/L — SIGNIFICANT CHANGE UP (ref 22–31)
CREAT SERPL-MCNC: 0.92 MG/DL — SIGNIFICANT CHANGE UP (ref 0.5–1.3)
EGFR: 105 ML/MIN/1.73M2 — SIGNIFICANT CHANGE UP
EOSINOPHIL # BLD AUTO: 0.15 K/UL — SIGNIFICANT CHANGE UP (ref 0–0.5)
EOSINOPHIL NFR BLD AUTO: 2 % — SIGNIFICANT CHANGE UP (ref 0–6)
ETHANOL SERPL-MCNC: 229 MG/DL — HIGH (ref 0–3)
GLUCOSE SERPL-MCNC: 98 MG/DL — SIGNIFICANT CHANGE UP (ref 70–99)
HCT VFR BLD CALC: 43.5 % — SIGNIFICANT CHANGE UP (ref 39–50)
HGB BLD-MCNC: 15.1 G/DL — SIGNIFICANT CHANGE UP (ref 13–17)
IMM GRANULOCYTES NFR BLD AUTO: 0.1 % — SIGNIFICANT CHANGE UP (ref 0–0.9)
LYMPHOCYTES # BLD AUTO: 2.43 K/UL — SIGNIFICANT CHANGE UP (ref 1–3.3)
LYMPHOCYTES # BLD AUTO: 32.5 % — SIGNIFICANT CHANGE UP (ref 13–44)
MCHC RBC-ENTMCNC: 32.3 PG — SIGNIFICANT CHANGE UP (ref 27–34)
MCHC RBC-ENTMCNC: 34.7 GM/DL — SIGNIFICANT CHANGE UP (ref 32–36)
MCV RBC AUTO: 93.1 FL — SIGNIFICANT CHANGE UP (ref 80–100)
MONOCYTES # BLD AUTO: 0.51 K/UL — SIGNIFICANT CHANGE UP (ref 0–0.9)
MONOCYTES NFR BLD AUTO: 6.8 % — SIGNIFICANT CHANGE UP (ref 2–14)
NEUTROPHILS # BLD AUTO: 4.3 K/UL — SIGNIFICANT CHANGE UP (ref 1.8–7.4)
NEUTROPHILS NFR BLD AUTO: 57.7 % — SIGNIFICANT CHANGE UP (ref 43–77)
NRBC # BLD: 0 /100 WBCS — SIGNIFICANT CHANGE UP (ref 0–0)
PLATELET # BLD AUTO: 262 K/UL — SIGNIFICANT CHANGE UP (ref 150–400)
POTASSIUM SERPL-MCNC: 3.6 MMOL/L — SIGNIFICANT CHANGE UP (ref 3.5–5.3)
POTASSIUM SERPL-SCNC: 3.6 MMOL/L — SIGNIFICANT CHANGE UP (ref 3.5–5.3)
PROT SERPL-MCNC: 7.7 G/DL — SIGNIFICANT CHANGE UP (ref 6–8.3)
RBC # BLD: 4.67 M/UL — SIGNIFICANT CHANGE UP (ref 4.2–5.8)
RBC # FLD: 11.9 % — SIGNIFICANT CHANGE UP (ref 10.3–14.5)
SODIUM SERPL-SCNC: 136 MMOL/L — SIGNIFICANT CHANGE UP (ref 135–145)
WBC # BLD: 7.47 K/UL — SIGNIFICANT CHANGE UP (ref 3.8–10.5)
WBC # FLD AUTO: 7.47 K/UL — SIGNIFICANT CHANGE UP (ref 3.8–10.5)

## 2023-07-10 PROCEDURE — 99284 EMERGENCY DEPT VISIT MOD MDM: CPT

## 2023-07-10 PROCEDURE — 80053 COMPREHEN METABOLIC PANEL: CPT

## 2023-07-10 PROCEDURE — 73562 X-RAY EXAM OF KNEE 3: CPT

## 2023-07-10 PROCEDURE — 85025 COMPLETE CBC W/AUTO DIFF WBC: CPT

## 2023-07-10 PROCEDURE — 99285 EMERGENCY DEPT VISIT HI MDM: CPT

## 2023-07-10 PROCEDURE — 80307 DRUG TEST PRSMV CHEM ANLYZR: CPT

## 2023-07-10 PROCEDURE — 36415 COLL VENOUS BLD VENIPUNCTURE: CPT

## 2023-07-10 PROCEDURE — 73562 X-RAY EXAM OF KNEE 3: CPT | Mod: 26,RT

## 2023-07-10 RX ORDER — SODIUM CHLORIDE 9 MG/ML
1000 INJECTION INTRAMUSCULAR; INTRAVENOUS; SUBCUTANEOUS ONCE
Refills: 0 | Status: COMPLETED | OUTPATIENT
Start: 2023-07-10 | End: 2023-07-10

## 2023-07-10 RX ADMIN — SODIUM CHLORIDE 1000 MILLILITER(S): 9 INJECTION INTRAMUSCULAR; INTRAVENOUS; SUBCUTANEOUS at 19:17

## 2023-07-10 NOTE — ED PROVIDER NOTE - NS ED ATTENDING STATEMENT MOD
This was a shared visit with the LATASHA. I reviewed and verified the documentation and independently performed the documented:

## 2023-07-10 NOTE — ED PROVIDER NOTE - CARE PROVIDER_API CALL
Melquiades Wagoner  Orthopaedic Surgery  [address] [place] [address]  Phone: [phone]  Fax: [phone]  Follow Up Time: 1-3 Days

## 2023-07-10 NOTE — ED PROVIDER NOTE - OBJECTIVE STATEMENT
47-year-old male with history of EtOH abuse and intoxication brought in by ambulance for intoxication and right knee pain.  Patient states he had a history of alcohol abuse.  Last drink 3 months ago.  Patient states he got really sick in the past from drinking too much alcohol.  States he started to drink again today after not drinking for 3 months.  States he had 3 large beers.  States his wife called EMS and was not aware that she called them. States wife had to go to work and took their kids to the grandparents house.  EMS arrived to the house and asked him if he wanted to come to emergency room.  Patient states he has had right knee pain for many years and had continued right knee pain so "thought he would come to the emergency room".  Denies any new injury or trauma.  Denies chest pain or shortness of breath.  Denies headache, dizziness, or confusion.  PCP Chelsea Squires

## 2023-07-10 NOTE — ED PROVIDER NOTE - CLINICAL SUMMARY MEDICAL DECISION MAKING FREE TEXT BOX
Patient brought in by EMS for alcohol intoxication and chronic right knee pain.  Patient relates he has a history of alcohol abuse was sober for 3 months however began drinking again today.  Patient relates when his wife went to work she brought the children to his in-laws house but unbeknownst to him she called EMS because he was intoxicated.  He is only complaining of chronic right knee pain at this time denies any other pain.  No dizziness nausea vomiting shortness of breath weakness numbness or any other complaints.    Plan right knee x-ray labs IV fluids

## 2023-07-10 NOTE — ED ADULT NURSE NOTE - NSFALLRISKINTERV_ED_ALL_ED
Assistance OOB with selected safe patient handling equipment if applicable/Assistance with ambulation/Communicate fall risk and risk factors to all staff, patient, and family/Monitor gait and stability/Monitor for mental status changes and reorient to person, place, and time, as needed/Provide visual cue: yellow wristband, yellow gown, etc/Reinforce activity limits and safety measures with patient and family/Toileting schedule using arm’s reach rule for commode and bathroom/Use of alarms - bed, stretcher, chair and/or video monitoring/Call bell, personal items and telephone in reach/Instruct patient to call for assistance before getting out of bed/chair/stretcher/Non-slip footwear applied when patient is off stretcher/[place] to call system/Physically safe environment - no spills, clutter or unnecessary equipment/Purposeful Proactive Rounding/Room/bathroom lighting operational, light cord in reach

## 2023-07-10 NOTE — ED PROVIDER NOTE - PROGRESS NOTE DETAILS
resting comfortably. alcohol 229. Dr. Rivas will assume care clinically sober, steady gait, wants to go home, pt will f/u with outpatient ortho

## 2023-07-10 NOTE — ED PROVIDER NOTE - MUSCULOSKELETAL, MLM
mild tenderness to medical joint line of right knee. no swelling. no laxity appreciated. full ROM. patellar tendon intact

## 2023-07-10 NOTE — ED ADULT NURSE NOTE - OBJECTIVE STATEMENT
Pt is alert and oriented. Pt states that he has not drank alcohol in 4 months and drank wine today. Pt states that his wife called the police on him because she was leaving to go to work and doesn't want him drinking. Pt denies falls. Pt states that he has had right knee pain for 6 months. Pt states that he has not been seen by a doctor. Pt denies sob, chest pain, nausea, vomiting, and dizziness. Pt resp are even and unlabored, skin color jackelyn for race. Pt updated on plan of care.

## 2023-07-10 NOTE — ED PROVIDER NOTE - PATIENT PORTAL LINK FT
You can access the FollowMyHealth Patient Portal offered by [facility] by registering at the following website: [email or web address]. By joining [facility]’s FollowMyHealth portal, you will also be able to view your health information using other applications (apps) compatible with our system.

## 2023-07-10 NOTE — ED PROVIDER NOTE - NSFOLLOWUPINSTRUCTIONS_ED_ALL_ED_FT
limit alcohol  ice  follow up with orthopedics regarding knee pain, referral provided if needed       Alcohol Intoxication  Alcohol intoxication happens when you cannot think clearly or function well after drinking alcohol. This can happen after just one drink. The effect that alcohol has on how you think and function depends on:  How much alcohol you drank.  Your age, your weight, and whether you are a man or a woman.  How often you drink alcohol.  If you have other medical problems.  Alcohol intoxication can range from mild to severe. It can be dangerous, especially if:  You drink a large amount of alcohol in a short time (binge drink).  You drink alcohol and take drugs or medicines.  What are the causes?  This condition is caused by drinking alcohol.    What increases the risk?  Peer pressure in young adults.  Difficulty managing stress.  History of drug or alcohol abuse.  Drinking alcohol and taking drugs.  Family history of drug or alcohol abuse.  Low body weight.  Binge drinking.  What are the signs or symptoms?  Feeling relaxed or sleepy.  Having mild difficulty with coordination, speech, memory, or attention.  Strong anger or extreme sadness.  Severe difficulty with coordination, speech, memory, or attention.  Other symptoms may include:  Passing out.  Vomiting.  Confusion.  Slow breathing.  Coma.  How is this treated?  This condition may be treated with:  Close monitoring in the hospital.  IV fluids to add water in your body.  Medicine to treat vomiting or to get rid of alcohol in the body.  Counseling about the dangers of alcohol.  Oxygen therapy or a breathing machine (ventilator).  You may also be treated for substance use disorder.    Follow these instructions at home:  Eating and drinking    A 12-ounce bottle of beer, a 5-ounce glass of wine, and a 1.5-ounce shot of hard liquor.  Do not drink alcohol if:  Your doctor tells you not to drink.  You are pregnant, may be pregnant, or are planning to get pregnant.  You are under the legal drinking age (21 years old in the [place]  You are taking medicines that you should not take with alcohol.  Alcohol causes your medical problem to get worse.  You have to drive or do activities that need you to be alert.  You have substance use disorder. This is when using alcohol again and again causes problems with your health, your relationships, or with what you need to do at work, home, or school.  Ask your doctor if alcohol is safe for you. If you are allowed to drink, limit how much you have to:  0–1 drink a day for women who are not pregnant.  0–2 drinks a day for men.  Know how much alcohol is in your drink. In the [place] one drink equals one 12 oz bottle of beer (355 mL), one 5 oz glass of wine (148 mL), or one 1½ oz glass of hard liquor (44 mL).  Be sure to eat before you drink alcohol.  Alcohol increases peeing. It is important to stay hydrated and avoid caffeine.  Caffeine may be in coffee, tea, and some sodas.  Caffeine can make you thirsty.  Do not drink more than one drink in an hour.  If you are having more than one drink, have a drink without alcohol (such as water) between your drinks.  General instructions    A sign showing that a person should not drive.  Take over-the-counter and prescription medicines only as told by your doctor.  Do not drive after drinking any amount of alcohol. Plan for a designated  or another way to go home.  Have someone you trust stay with you while you are intoxicated. Youshould not be left alone.  Contact a doctor if:  You do not feel better after a few days.  You have problems at work, at school, or at home due to drinking.  You have trouble with any of the following:  Movement.  Talking.  Memory.  Paying attention to things.  Get help right away if:  You have trouble staying awake.  You are light-headed or faint.  You feel very confused.  You have trouble staying awake.  You are vomiting blood. The blood may be bright red or look like coffee grounds.  You have been told that you have had jerky movements that you cannot control (seizure).  You have blood in your poop (stool). The blood may:  Be bright red.  Make your poop black and tarry and make it smell bad.  These symptoms may be an emergency. Get help right away. Call 911.  Do not wait to see if the symptoms will go away.  Do not drive yourself to the hospital.  Also, get help right away if:  You have thoughts about hurting yourself or others.  Take one of these steps if you feel like you may hurt yourself or others, or have thoughts about taking your own life:  Call 911.  Call the National Suicide Prevention Lifeline at 8-234-356-6835-488-2655 bs 708. This is open 24 hours a day.  Text the Crisis Text Line at 840329.  Summary  Alcohol intoxication happens when you cannot think clearly or function well after drinking alcohol. This can happen after just one drink.  If your doctor says that alcohol is safe for you, limit how much you drink.  Contact a doctor if you have problems at work, at school, or at home due to drinking.  Get help right away if you have thoughts about hurting yourself or others.  This information is not intended to replace advice given to you by your health care provider. Make sure you discuss any questions you have with your health care provider.

## 2023-07-10 NOTE — ED PROVIDER NOTE - DIFFERENTIAL DIAGNOSIS
Differentials include but not limited to alcohol intoxication, dehydration, electrolyte abnormality, knee sprain, strain, arthritis, fracture Differential Diagnosis

## 2023-07-10 NOTE — ED CLERICAL - CLERICAL COMMENTS
called for taxi at 0545.  they said about 1 hour. called for taxi at [phone].  they said about 1 hour. [facility] called back at [phone]. they do not have any cabs.

## 2023-07-10 NOTE — ED PROVIDER NOTE - CONSTITUTIONAL, MLM
normal... awake, alert, oriented to person, place, time/situation and in no apparent distress. +smell of etoh

## 2024-05-08 NOTE — ED PROVIDER NOTE - CLINICAL SUMMARY MEDICAL DECISION MAKING FREE TEXT BOX
Home
acute lumbar pain from heavy lifting ,  xray negative   Rx Percocet steroids and F/U with ortho referral

## 2024-10-12 NOTE — ED ADULT NURSE NOTE - PLAN OF CARE
Pre treat with debrox, aware to do both as needed, discussed to f/u with pcp or return for cerumen impaction removal.    Explanation of exam/test

## 2024-11-14 NOTE — ED ADULT TRIAGE NOTE - NS ED TRIAGE AVPU SCALE
Alert-The patient is alert, awake and responds to voice. The patient is oriented to time, place, and person. The triage nurse is able to obtain subjective information. Wound of skin

## 2024-11-28 NOTE — ED PROVIDER NOTE - WR INTERPRETED BY 1
Daily Note     Today's date: 2024  Patient name: Fina Ervin  : 1958  MRN: [id number]  Referring provider: Miles Adair DO  Dx:   Encounter Diagnosis     ICD-10-CM    1. Right sided sciatica  M54.31                      Subjective: Cervical ablation went very well, she is looking forward to progressing to more postural strengthening      Objective: See treatment diary below      Assessment: Tolerated treatment well. Patient would benefit from continued PT      Plan: Continue per plan of care.      Precautions: na      Manuals 11/25 11/27 7/24 7/31 8/12 8/14 8/20 8/26 10/23 11/19   Elena Ga